# Patient Record
Sex: MALE | Race: BLACK OR AFRICAN AMERICAN | URBAN - METROPOLITAN AREA
[De-identification: names, ages, dates, MRNs, and addresses within clinical notes are randomized per-mention and may not be internally consistent; named-entity substitution may affect disease eponyms.]

---

## 2020-07-24 ENCOUNTER — HISTORICAL (OUTPATIENT)
Dept: ADMINISTRATIVE | Facility: HOSPITAL | Age: 40
End: 2020-07-24

## 2020-07-27 ENCOUNTER — HISTORICAL (OUTPATIENT)
Dept: ADMINISTRATIVE | Facility: HOSPITAL | Age: 40
End: 2020-07-27

## 2020-08-06 ENCOUNTER — HISTORICAL (OUTPATIENT)
Dept: SURGERY | Facility: HOSPITAL | Age: 40
End: 2020-08-06

## 2020-08-19 ENCOUNTER — HISTORICAL (OUTPATIENT)
Dept: ADMINISTRATIVE | Facility: HOSPITAL | Age: 40
End: 2020-08-19

## 2020-09-18 ENCOUNTER — HISTORICAL (OUTPATIENT)
Dept: ADMINISTRATIVE | Facility: HOSPITAL | Age: 40
End: 2020-09-18

## 2022-04-10 ENCOUNTER — HISTORICAL (OUTPATIENT)
Dept: ADMINISTRATIVE | Facility: HOSPITAL | Age: 42
End: 2022-04-10

## 2022-04-29 VITALS
HEIGHT: 72 IN | BODY MASS INDEX: 27.8 KG/M2 | DIASTOLIC BLOOD PRESSURE: 85 MMHG | BODY MASS INDEX: 28.99 KG/M2 | WEIGHT: 205 LBS | SYSTOLIC BLOOD PRESSURE: 125 MMHG | WEIGHT: 214.06 LBS | HEIGHT: 72 IN | HEIGHT: 72 IN | WEIGHT: 210.31 LBS | BODY MASS INDEX: 28.48 KG/M2 | WEIGHT: 205.25 LBS | BODY MASS INDEX: 27.77 KG/M2 | HEIGHT: 72 IN

## 2022-04-30 NOTE — H&P
Patient:   Paras Montenegro             MRN: [id number]            FIN: [id number]               Age:   40 years     Sex:  Male     :  1980   Associated Diagnoses:   None   Author:   Aline May MD      Chief Complaint:  Right wrist fx, Date of injury: 20  History of Present Illness    40-year-old male who fell at home on 2020 and was diagnosed with a right distal radius fracture.  He is here today for fixation of the right distal radius with Dr. Vasquez.  Review of Systems Constitutional: no fever, fatigue, weakness  Eye: no vision loss, eye redness, drainage, or pain  ENMT: no sore throat, ear pain, sinus pain/congestion, nasal congestion/drainage  Respiratory: no cough, no wheezing, no shortness of breath  Cardiovascular: no chest pain, no palpitations, no edema  Gastrointestinal: no nausea, vomiting, or diarrhea. No abdominal pain   Physical Exam Vitals & Measurements WT: 93.1 kg BMI: 28.11   General appearance: No acute distress  Orientation: Awake, alert and oriented to person, place and time  Mood/Affect: Normal  Gait: Normal  Coordination: Normal    Right upper extremity:  Inspection: Swelling and bruising about the distal forearm  Palpation: Tenderness of palpation diffusely about the wrist  Range of motion: Deferred secondary to known fracture  Stability: Crepitus palpable at the fracture  Motor: Intact to thumbs-up, A-okay and finger cross  Skin: No open wounds, rashes  Vascular: 2+ radial pulse  Sensation: Intact in median, ulnar and radial nerves   Assessment/Plan:  40-year-old male who fell at home on 2020 and was diagnosed with a right distal radius fracture.  He is here today for fixation of the right distal radius with Dr. Vasquez.

## 2022-05-02 NOTE — HISTORICAL [facility] CERNER
This is a historical note converted from Jessie. Formatting and pictures may have been removed.  Please reference Jessie for original formatting and attached multimedia. Chief Complaint  post op right wrist  History of Present Illness  Surgeries:  8/6/20 ORIF?right distal radius fracture  ?  40-year-old male presents for 1st postoperative appointment. ?He has been doing well. ?Pain has improved. ?He is taking minimal pain medicine. ?No new numbness or tingling.  Review of Systems  Constitutional:?no fever, fatigue, weakness  Eye:?no vision loss, eye redness, drainage, or pain  ENMT:?no sore throat, ear pain, sinus pain/congestion, nasal congestion/drainage  Respiratory:?no cough, no wheezing, no shortness of breath  Cardiovascular:?no chest pain, no palpitations, no edema  Gastrointestinal:?no nausea, vomiting, or diarrhea. No abdominal pain  Physical Exam  Vitals & Measurements  HT:?182.00?cm? WT:?93.000?kg? BMI:?28.08?  Right wrist: Volar incision is well approximated. ?No erythema or drainage, no area of concern. ?Sutures are ready for removal. ?2+ radial pulse. ?Sensation intact radial, ulnar and median nerve. ?Motor function intact to ulnar, anterior interosseous and posterior interosseous nerves. ?Wrist range of motion is slightly limited, extension passively to 5?, flexion passively to 45?.  Assessment/Plan  1.?Colles fracture of right radius, subsequent encounter for closed fracture with routine healing?S52.531D  ?Sutures out. ?Transition to a wrist brace. ?Range of motion?times today. ?No weightbearing for another 4 weeks.? He may get wound wet. ?We will see him back in 4 weeks with x-rays.  ?  I was present with the resident during the history and exam.  ???  [x ] I discussed the case with the resident and agree with the findings and plan as documented in the residents note.  [ ] I discussed the case with the resident and agree with the findings and plan as documented in the residents note  except:  Ordered:  Clinic Follow up, *Est. 09/16/20 3:00:00 CDT, Order for future visit, Colles fracture of right radius, subsequent encounter for closed fracture with routine healing, [facility]  XR Wrist Right Minimum 3 Views, Routine, *Est. 09/16/20 3:00:00 CDT, None, Ambulatory, Rad Type, Order for future visit, Colles fracture of right radius, subsequent encounter for closed fracture with routine healing, Not Scheduled, *Est. 09/16/20 3:00:00 CDT  ?   Medications  acetaminophen-hydrocodone 325 mg-7.5 mg oral tablet, 1 tab(s), Oral, BID  Diagnostic Results  X-rays of the right wrist were obtained and reviewed in clinic. ?He underwent interval reduction and internal fixation. ?Plate and screws are in good position, no evidence of complication. ?Fracture hasnt started healing yet?but alignment in the coronal and sagittal planes is good

## 2022-05-02 NOTE — HISTORICAL [facility] CERNER
This is a historical note converted from Jessie. Formatting and pictures may have been removed.  Please reference Jessie for original formatting and attached multimedia. Chief Complaint  referral for right wrist fx  History of Present Illness  Chief complaint:?Right wrist injury  Date of injury:?7/6/20  ?  40-year-old male?here to fall in his home couple weeks ago he injured his right wrist. ?He was seen in urgent care and then went to the ER for orthopedics?referral. ?He was told he has a wrist fracture. ?He was placed in a splint. ?Since his injury, pain is steady but slightly improving. ?He denies numbness or tingling. ?Doesnt really have any associated pain or radiating pain.? His pain is worse?when his arm is in a dependent position. ?Feels better with elevation and pain medicine.  Review of Systems  Constitutional:?no fever, fatigue, weakness  Eye:?no vision loss, eye redness, drainage, or pain  ENMT:?no sore throat, ear pain, sinus pain/congestion, nasal congestion/drainage  Respiratory:?no cough, no wheezing, no shortness of breath  Cardiovascular:?no chest pain, no palpitations, no edema  Gastrointestinal:?no nausea, vomiting, or diarrhea. No abdominal pain  Physical Exam  Vitals & Measurements  WT:?93.1?kg? BMI:?28.11?  General appearance: No acute distress  Orientation: Awake, alert and oriented to person, place and time  Mood/Affect: Normal  Gait: Normal  Coordination: Normal  ?   Right upper extremity:  Inspection:?Swelling and bruising about the distal forearm  Palpation:?Tenderness of palpation diffusely?about the wrist  Range of motion:?Deferred secondary to known fracture  Stability:?Crepitus palpable?at the fracture  Motor:?Intact to thumbs-up, A-okay and?finger cross  Skin:?No open wounds, rashes  Vascular:?2+ radial pulse  Sensation:?Intact in median, ulnar and radial nerves  Assessment/Plan  1.?Other intraarticular fracture of lower end of right radius, initial encounter for closed  fracture?S52.571A  ?This is an operative distal radius fracture. ?We did discuss nonoperative management and the risks and benefits therein. ?We also discussed the risks of surgery including infection, wound breakdown, damage to?neurovascular structures and stiffness. ?Benefit of surgery is anatomic healing with improved function long-term. ?He understands and wants to pursue surgery. ?We will get him on the schedule for Thursday, July 30. ?Consents were obtained in clinic.? Given some pain medicine so he has?some told him over until surgery.? We will give him a work excuse as well.  Ordered:  XR Wrist Right Minimum 3 Views, Routine, 07/24/20 9:08:00 CDT, None, Ambulatory, Rad Type, Other intraarticular fracture of lower end of right radius, initial encounter for closed fracture, Not Scheduled, 07/24/20 9:08:00 CDT  ?  Orders:  acetaminophen-HYDROcodone, 1 tab(s), Oral, BID, X 6 day(s), # 12 tab(s), 0 Refill(s), Pharmacy: [facility] #[id number], 182, cm, Height/Length Dosing, 07/24/20 9:08:00 CDT, 93.1, kg, Weight Dosing, 07/24/20 9:08:00 CDT  Clinic Follow-up PRN, 07/24/20 9:44:00 CDT   Problem List/Past Medical History  Ongoing  No chronic problems  Historical  No qualifying data  Procedure/Surgical History  Application of short arm splint (forearm to hand); static (07/07/2020)  Immobilization of Right Lower Arm using Splint (07/07/2020)   Medications  Inpatient  No active inpatient medications  Home  acetaminophen-hydrocodone 325 mg-7.5 mg oral tablet, 1 tab(s), Oral, BID  Allergies  No Known Allergies  Social History  Abuse/Neglect  No, 07/07/2020  Sexual  Gender Identity Identifies as male., 07/24/2020  Tobacco  Never (less than 100 in lifetime), N/A, 07/24/2020  Diagnostic Results  X-rays of the right wrist obtained today show?a displaced distal radius fracture with associated ulnar styloid fracture. ?Significant dorsal angulation as well as loss of radial height and inclination      The risks, benefits,  outcomes, and alternatives of conservative vs operative management were discussed with the patient in clinic today.? Informed consent was obtained for?ORIF right distal radius.  ?   Paras Montenegro?was evaluated at the time of the encounter with?Dr Oconnor.? HPI, PE and treatment plan was reviewed.? Treatment plan was reasonable and necessary.??Imaging was reviewed at the time of visit.??

## 2022-05-02 NOTE — HISTORICAL [facility] CERNER
This is a historical note converted from Jessie. Formatting and pictures may have been removed.  Please reference Jessie for original formatting and attached multimedia. Chief Complaint  follow up orif right distal radius  History of Present Illness  Surgeries:  8/6/20 ORIF?right distal radius fracture  ?   40-year-old male?returns for follow-up?after his surgery, he is about 6 weeks out from surgery.? Sutures were removed and he was given a wrist brace at last visit. ?He was encouraged in range of motion but remain nonweightbearing.? Has been wearing a brace and working on range of motion but he still feels like he is pretty stiff.? Otherwise, his pain is improved significantly. ?He is beginning to return to normal function around the house.  Review of Systems  Constitutional:?no fever, fatigue, weakness  Eye:?no vision loss, eye redness, drainage, or pain  ENMT:?no sore throat, ear pain, sinus pain/congestion, nasal congestion/drainage  Respiratory:?no cough, no wheezing, no shortness of breath  Cardiovascular:?no chest pain, no palpitations, no edema  Gastrointestinal:?no nausea, vomiting, or diarrhea. No abdominal pain  Physical Exam  Vitals & Measurements  HT:?182.00?cm? WT:?95.400?kg? BMI:?28.8?  Right wrist:?Volar incision is well-healed. ?No erythema or drainage. ?No tenderness to palpation over the distal radius. ?No gross instability on range of motion examination. ?Range of motion of the wrist is limited. ?Flexion is about 45?, extension is?maybe 30?.? Pronation is about 80?, supination is 20?.? Full finger range of motion.? Normal motor and sensory function in the hand. ?2+ radial pulse  Assessment/Plan  1.?Colles fracture of right radius, subsequent encounter for closed fracture with routine healing?S56.298G  His x-rays are progressing as expected. ?We will?transition him to weightbearing as tolerated. ?I encouraged him to do this slowly so that he can make sure he understands his limits. ?We expect him  to be a little weak at this point. ?As far as his motion, he is improving but still lagging behind what I would expect him to be. ?We are going to send him to occupational therapy?to give him a boot. ?We will see him back in 6 weeks with repeat x-rays.  Ordered:  Clinic Follow up, *Est. 10/30/20 3:00:00 CDT, Order for future visit, Colles fracture of right radius, subsequent encounter for closed fracture with routine healing, [facility]  XR Wrist Right Minimum 3 Views, Routine, 09/18/20 9:30:00 CDT, pain, None, Ambulatory, S52.531D, Not Scheduled, 09/18/20 9:30:00 CDT  XR Wrist Right Minimum 3 Views, Routine, *Est. 10/30/20 3:00:00 CDT, None, Ambulatory, Rad Type, Order for future visit, Colles fracture of right radius, subsequent encounter for closed fracture with routine healing, Not Scheduled, *Est. 10/30/20 3:00:00 CDT  ?  Paras Montenegro?was evaluated at the time of the encounter with?Dr Oconnor.? HPI, PE and treatment plan was reviewed.? Treatment plan was reasonable and necessary.??Imaging was reviewed at the time of visit.??   Medications  acetaminophen-hydrocodone 325 mg-5 mg oral tablet, 1 tab(s), Oral, q6hr,? ?Not Taking, Completed Rx  acetaminophen-hydrocodone 325 mg-7.5 mg oral tablet, 1 tab(s), Oral, BID,? ?Not Taking, Completed Rx  Diagnostic Results  X-rays of the right wrist in clinic today show?stable fracture and hardware alignment. ?Reduction was maintained. ?No evident complication.? Interval callus formation indicates healing?is progressing